# Patient Record
Sex: MALE | Race: WHITE
[De-identification: names, ages, dates, MRNs, and addresses within clinical notes are randomized per-mention and may not be internally consistent; named-entity substitution may affect disease eponyms.]

---

## 2018-11-27 ENCOUNTER — HOSPITAL ENCOUNTER (OUTPATIENT)
Dept: HOSPITAL 47 - PNWHC3 | Age: 58
LOS: 13 days | Discharge: HOME | End: 2018-12-10
Payer: COMMERCIAL

## 2018-11-27 VITALS — BODY MASS INDEX: 20.9 KG/M2

## 2018-11-27 DIAGNOSIS — M54.17: Primary | ICD-10-CM

## 2018-11-27 DIAGNOSIS — F17.200: ICD-10-CM

## 2018-11-27 DIAGNOSIS — Z79.899: ICD-10-CM

## 2018-11-27 PROCEDURE — 64483 NJX AA&/STRD TFRM EPI L/S 1: CPT

## 2018-11-27 PROCEDURE — 99211 OFF/OP EST MAY X REQ PHY/QHP: CPT

## 2018-11-27 NOTE — P.PAINCN
History of Present Illness





- Reason for Consult


Consult date: 11/27/18





- History of Present Illness


This is a 58 years old male with a chronic history of severe left lower 

extremity pain and numbness and tingling sensation, started March 2017, patient 

denies any initiating event, but he reported that he had lumbar laminectomy 

surgery done in 2001, and he did fairly well until March 2017, he reported that 

his pain and numbness increases with any activity especially walking, and is 

interfering with his quality of life, he denies any motor or sensory deficit, 

he denies any change in the bowel movement or urination, he tried physical 

therapy without any benefit, he tried Neurontin without any benefit, he gets 

some relief with the Motrin, he denies any symptoms in the right lower extremity

,





Past Medical History


Past Medical History: Hypertension, Osteoarthritis (OA)


Additional Past Medical History / Comment(s): STATES ARTHRITIS SINCE HE WAS A 

TEENAGER., DIFFICULTY WALKING - LEFT FOOT NUMB AND PAIN RADIATES UP HIS LEG. ,  

HX EPISODE OF CONSTIPATION WITH BLEEDING.


History of Any Multi-Drug Resistant Organisms: None Reported


Past Surgical History: Back Surgery


Additional Past Surgical History / Comment(s): BACK SURGERY (2001- L-4, L-5)


Past Anesthesia/Blood Transfusion Reactions: No Reported Reaction


Smoking Status: Current every day smoker





- Past Family History


  ** Mother


Family Medical History: No Reported History





Medications and Allergies


 Home Medications











 Medication  Instructions  Recorded  Confirmed  Type


 


Ibuprofen 600 mg PO AS DIRECTED PRN 11/21/18 11/21/18 History


 


Lisinopril 20 mg PO DAILY 11/21/18 11/21/18 History


 


Metoprolol Tartrate [Lopressor] 25 mg PO BID 11/21/18 11/21/18 History











 Allergies











Allergy/AdvReac Type Severity Reaction Status Date / Time


 


No Known Allergies Allergy   Verified 11/21/18 10:04














Physical Exam


Vitals: 


 Vital Signs











  Pulse Resp Pulse Ox


 


 11/27/18 12:42  65  16  94 L











          Social history         :  smoker  , NO ETOH  , NO Illegal drugs use .


    


         Review of Systems :


          1- Constitutional      : no  chills , no  fever  , no  night sweats  

,   


          2- Ears                     :  no ear discharge ,   no  change in 

hearing


          3-Nose, Mouth ,Throat ; no bleeding gums, no sore throat  , no 

epistaxis  ,


         4-Cardiovascular      : Denies chest pain, , no  orthopnea , no 

palpitation, he had a history of high blood pressure   


         5-Respiratory            : Denies cough ,  no dyspnea , no hemoptysis  


         6-Gastrointestinal     :,  no change in bowel habits  , no coffee-

ground emesis  . 


         7-Genitourinary        : No hematuria  , no discharge   , no 

incontinence,       


         8-Musculoskeletal     : No gait dysfunction  ,  report low back pain   

,   


         9- Neurological       :  no ataxia   , no tremor   , no sezure ,      

                                                                        


         10-Psychatric        , no suicidal ideation   no hallucination  


         11- Endocrine        :  no cold intolerence  ,  no polyuria  , no 

polydypsia  ,    


         12-Hematologic      : no  easy  bleeding  , no easy brusing   ,    


         13-Allergic / immunology  : no angioedema   , no wheezing    ,no 

allergic  rhinitis     


         14-Integumentary   : no brttle nails   , no change hair / nails   , no 

foot/leg ulcers .


    


        Physical Examinations :


         1-Constitutional : Cooperative , not in acute distress .


        2-HEENT    :  nech ;  supple ,  no Lymphadenopathy  , no Thyromegaly  ,


                               :eyes  , no icterus,  no photophobia .  


                               ENT  :  , normal  oropharynx     , no Thrush 


        3- Respiratory     : Chest clear to auscultations Bilaterally  ,  no 

wheezing                                                                       

                                                                               

                                       .  


        4- Cardiovascular : regular rate and rhythem , S1 ,  S2  ,   no  S3 ,  

no  S4.


        5- Gastrointestinal: abdomen soft  no tenderness , no organomegally  .


        6- Genitourinary   :  Defferred  .


        7-Integumentary   : No cellulitis  ,  no ulcers   , normal skin turgor  

, no cyanotic   .


        8- neurologic       :  Cranial nerve II   to  XII  intact ,  no   focal 

neurological deffecit 


        9-psychatric        : alert ,  oriented  X 3  ,   appropriate affect   

, intact judgment  and insight  .  


       10-Lymphatic       :  no Lymphadenopathy.


        11- musculoskeltal:  normal gait    


                              Lumber spine


                                    moter stegnth lower extremities ,thigh and 

legs  5/5 Right side ,  5/5  Left side 


                                    deep tendon reflexes :   normal  Knee Jerk 

   , normal   ankle Jerk  


                                    lumber facet Loading Test, negative 

bilaterally


                                    Range of motion of the lumbar spine  

Flexion  60 degrees,   extension  30 degrees


                                    strait leg raising test , negative 

bilaterally  


                                    Fabere test negative bilaterally


                                    Normal sensation in the lower extremities


                                 





Results


Comments: 


MRI of the lumbar spine done 10/09/2018 at [address] [place] [address]= L5-S1 central 

discogenic protrusion towards the left side and narrowing of the spinal canal





Assessment and Plan


Plan: 


Assessment and plan=1-lumbar radiculopathy left  L5-S1 dermatomal distribution, 

patient will be good candidate to have left L5-S1 transforaminal epidural 

steroid injection under fluoroscopy guidance , also patient could benefit from 

Lyrica 25 mg twice a day and it will be increased in the future as tolerated, 

patient used Neurontin in the past without any benefit. 


Patient blood pressure is 205/125 and explained to the patient and this is 

hypertension emergency and he need to monitor the emergency room to manage his 

blood pressure patient reported that he is very nervous about coming to the 

pain clinic , but I explained to him that he needs to go to the emergency room 

to manage his blood pressure , and explained to the patient the possible 

complication of hyper tension witches could affect his heart , could cause a 

stroke , and it will affect his kidney and his eyes 


Time with Patient: Greater than 30





PQRS Measure Charge Sheet


Measure #130: Documentation of Current Meds in Medical Chart: Patient's 

medications documented in chart


Measure #226: Tobacco Use: Screen & Cessation Intervention: Pt screened for 

tobacco use AND intervention given


Measure #111: Pneumonia Vaccination: Pneumococcal vaccine NOT administered or 

previously given


Measure #47: Advance Care Plan: Advance care planning discussed & documented, 

pt chose/unable to give


Measure #412: Opioid Treatment Agreement: No documentation of signed opioid 

treatment agreement


Measure #408: Opioid Therapy Follow-up Evaluation: Patient had NO f/u eval 

minimum every 3 months during opioid therapy


Measure #317: Preventitive Care & Scrn High Bld Press & F/U: Pre-hypertensive 

or hypertensive BP documented, pt will f/u with PCP


Measure #128: Body Mass Index (BMI) Screening & Follow-up: BMI documented 

within normal parameters


Measure #131: Pain Assessment & Follow-up: Pain positive & plan documented, 

Follow-up scheduled


Measure #431: Unhealthy Alcohol Use Preventative Care & Scrn: Patient not 

identified as an unhealthy alcohol user


PQRS Narrative: 


 





Smoking Status                   Current every day smoker


Do You Want the Pneumonia        No


Vaccine AT THIS TIME?            


Pain Intensity [Left Foot]       8


Scale Used                       Numeric (1 - 10)


Hx Alcohol Use (MH)              Yes








Home Medications: 


Ambulatory Orders





Ibuprofen 600 mg PO AS DIRECTED PRN 11/21/18 


Lisinopril 20 mg PO DAILY 11/21/18 


Metoprolol Tartrate [Lopressor] 25 mg PO BID 11/21/18

## 2018-12-10 ENCOUNTER — HOSPITAL ENCOUNTER (OUTPATIENT)
Dept: HOSPITAL 47 - ORPAIN | Age: 58
Discharge: HOME | End: 2018-12-10
Attending: ANESTHESIOLOGY
Payer: COMMERCIAL

## 2018-12-10 VITALS
TEMPERATURE: 98 F | DIASTOLIC BLOOD PRESSURE: 94 MMHG | RESPIRATION RATE: 16 BRPM | HEART RATE: 77 BPM | SYSTOLIC BLOOD PRESSURE: 157 MMHG | RESPIRATION RATE: 18 BRPM

## 2018-12-10 VITALS — BODY MASS INDEX: 20.3 KG/M2

## 2018-12-10 DIAGNOSIS — Z79.899: ICD-10-CM

## 2018-12-10 DIAGNOSIS — M51.17: Primary | ICD-10-CM

## 2018-12-10 DIAGNOSIS — I10: ICD-10-CM

## 2018-12-10 DIAGNOSIS — F17.200: ICD-10-CM

## 2018-12-10 DIAGNOSIS — M19.90: ICD-10-CM

## 2018-12-10 PROCEDURE — 62323 NJX INTERLAMINAR LMBR/SAC: CPT

## 2018-12-10 NOTE — FL
Fluoroscopy

 

INDICATION: Pain

 

FINDINGS:

 

Fluoroscopy time: 7 seconds.

 

Images obtained: 3.

 

IMPRESSIONS:

1. Documentation of fluoroscopy.

## 2018-12-10 NOTE — P.PCN
Date of Procedure: 12/10/18


Procedure(s) Performed: 








PREOPERATIVE DIAGNOSIS: Lumbar radiculopathy in left  L5-S1 distribution


POSTOPERATIVE DIAGNOSIS: Lumbar radiculopathy in left L5-S1  distribution


PROCEDURE


1. Transforaminal epidural steroid injection under fluoroscopic guidance at 

left L5-S1  level. 


2. Lumbar epidurogram :


ANESTHESIA: Local with 1% lidocaine 3 ml , moderate sedation with intravenous 

Versed 2 mg  and fentanyle 100 micrograms


EBL: Minimal


PROCEDURE INDICATION: The patient with low back pain and radiculopathy symptoms 

unresponsive to conservative treatment.


PROCEDURE DESCRIPTION / TECHNIQUE: 


  The patient was seen and identified in the preoperative area. Risks, benefits

, complications, and alternatives were discussed with the patient. The patient 

agreed to proceed with the procedure and signed the consent. IV was started, 

and vital signs were stable.


  Patient was taken to the OR and time out was completed. The patient was 

placed  in the prone position on procedure table and a pillow was placed under 

the abdomen to reduce lumbar lordosis. The  lumbosacral area was prepped  and 

draped in the usual sterile fashion. Critical pause was taken. Vital signs were 

closely monitored during the procedure. Conscious sedation was used during the 

procedure to decrease patient s anxiety. 


Using oblique fluoroscopy, the chin of the ``Christiano dog  at  left L5-S1  level 

was identified, and the skin and deeper tissues just below was localized with 1

% lidocaine. Subsequently, a 22-gauge 3.5-inch spinal needle was advanced under 

a tunneled view fluoroscopic guidance just underneath the chin of the ``Christiano 

dog at the left L5-S1. Under lateral fluoroscopy, the needle was then advanced 

to the posterior border of the Left L5-S1  interforaminal  space. After 

negative aspiration of CSF and blood and with no paresthesias, 1 mL Isovue  200 

contrast dye was injected excellent epidurogram and outlining of the  nerve 

root Subsequently, 3 mL of block solution containing 40 mg Depo-Medrol  and 2 

mL of  Lidocaine 1% was injected. Needle was removed . At the end of the 

procedure, skin was cleansed, and bandages were applied.


COMPLICATIONS:none 


DISPOSITION / PLANS: The patient was placed in a supine position and 

transferred to the recovery area in a stable condition for observation. There 

was no evidence of lower extremity motor or sensory deficit after the 

procedure.  Patient was discharged from the recovery room after meeting 

discharge criteria. Home discharge instructions were given to the patient by 

the staff. The patient was reexamined prior to discharge.

## 2019-01-16 ENCOUNTER — HOSPITAL ENCOUNTER (OUTPATIENT)
Dept: HOSPITAL 47 - ORPAIN | Age: 59
Discharge: HOME | End: 2019-01-16
Attending: ANESTHESIOLOGY
Payer: COMMERCIAL

## 2019-01-16 VITALS — SYSTOLIC BLOOD PRESSURE: 169 MMHG | HEART RATE: 64 BPM | DIASTOLIC BLOOD PRESSURE: 92 MMHG

## 2019-01-16 VITALS — BODY MASS INDEX: 20.9 KG/M2

## 2019-01-16 VITALS — RESPIRATION RATE: 16 BRPM | TEMPERATURE: 97.5 F

## 2019-01-16 DIAGNOSIS — I10: ICD-10-CM

## 2019-01-16 DIAGNOSIS — M54.16: Primary | ICD-10-CM

## 2019-01-16 DIAGNOSIS — F17.200: ICD-10-CM

## 2019-01-16 DIAGNOSIS — Z79.899: ICD-10-CM

## 2019-01-16 DIAGNOSIS — M19.90: ICD-10-CM

## 2019-01-16 PROCEDURE — 99152 MOD SED SAME PHYS/QHP 5/>YRS: CPT

## 2019-01-16 PROCEDURE — 62323 NJX INTERLAMINAR LMBR/SAC: CPT

## 2019-01-16 NOTE — FL
Fluoroscopy

 

INDICATION: Pain

 

FINDINGS:

 

Fluoroscopy time: 16 seconds.

 

Images obtained: 1.

 

IMPRESSIONS:

1. Documentation of fluoroscopy.

## 2019-01-16 NOTE — P.PCN
Date of Procedure: 01/16/19


Description of Procedure: 


PREOPERATIVE DIAGNOSIS: Lumbar radiculopathy in left  L5-S1 distribution





POSTOPERATIVE DIAGNOSIS: Lumbar radiculopathy in left L5-S1  distribution





PROCEDURE


1. Transforaminal epidural steroid injection under fluoroscopic guidance at 

left L5-S1  level. 


2. Lumbar epidurogram :





ANESTHESIA: Local with 1% lidocaine 3 ml , moderate sedation with intravenous 

Versed 2 mg  and fentanyle 100 micrograms





EBL: Minimal





PROCEDURE INDICATION: The patient with low back pain and radiculopathy symptoms 

unresponsive to conservative treatment.





PROCEDURE DESCRIPTION / TECHNIQUE: 


  The patient was seen and identified in the preoperative area. Risks, benefits

, complications, and alternatives were discussed with the patient. The patient 

agreed to proceed with the procedure and signed the consent. IV was started, 

and vital signs were stable.


  Patient was taken to the OR and time out was completed. The patient was 

placed  in the prone position on procedure table and a pillow was placed under 

the abdomen to reduce lumbar lordosis. The  lumbosacral area was prepped  and 

draped in the usual sterile fashion. Critical pause was taken. Vital signs were 

closely monitored during the procedure. Conscious sedation was used during the 

procedure to decrease patient s anxiety. 


Using oblique fluoroscopy, the chin of the ``Christiano dog  at  left L5-S1  level 

was identified, and the skin and deeper tissues just below was localized with 1

% lidocaine. Subsequently, a 22-gauge 3.5-inch spinal needle was advanced under 

a tunneled view fluoroscopic guidance just underneath the chin of the ``Christiano 

dog at the left L5-S1. Under lateral fluoroscopy, the needle was then advanced 

to the posterior border of the Left L5-S1  interforaminal  space. After 

negative aspiration of CSF and blood and with no paresthesias, 1 mL Isovue  200 

contrast dye was injected excellent epidurogram and outlining of the  nerve 

root Subsequently, 3 mL of block solution containing 40 mg Depo-Medrol  and 2 

mL of  Lidocaine 1% was injected. Needle was removed . At the end of the 

procedure, skin was cleansed, and bandages were applied.





COMPLICATIONS:none 





DISPOSITION / PLANS: The patient was placed in a supine position and 

transferred to the recovery area in a stable condition for observation. There 

was no evidence of lower extremity motor or sensory deficit after the 

procedure.  Patient was discharged from the recovery room after meeting 

discharge criteria. Home discharge instructions were given to the patient by 

the staff. The patient was reexamined prior to discharge.

## 2019-02-11 NOTE — P.GSHP
History of Present Illness


H&P Date: 01/16/19





58-year-old male presenting for transforaminal epidural steroid injection L5-S1

, radiating pain down his left leg to the dorsal aspect of his foot.  VAS is 6 

out of 10 in severity.


 


Physical Exam :


CVS: Regular rate and rhythm, no peripheral edema


Pulmonary: Nonlabored, no wheezing





Plan: Transforaminal epidural steroid injection left L5-S1





Past Medical History


Past Medical History: Hypertension, Osteoarthritis (OA)


Additional Past Medical History / Comment(s): STATES ARTHRITIS SINCE HE WAS A 

TEENAGER., DIFFICULTY WALKING - LEFT FOOT NUMB AND PAIN RADIATES UP HIS LEG. ,  

HX EPISODE OF CONSTIPATION WITH BLEEDING.


History of Any Multi-Drug Resistant Organisms: None Reported


Past Surgical History: Back Surgery


Additional Past Surgical History / Comment(s): BACK SURGERY (2001- L-4, L-5)


Past Anesthesia/Blood Transfusion Reactions: No Reported Reaction


Smoking Status: Current every day smoker





- Past Family History


  ** Mother


Family Medical History: No Reported History





Medications and Allergies


 Home Medications











 Medication  Instructions  Recorded  Confirmed  Type


 


Ibuprofen 600 mg PO AS DIRECTED PRN 11/21/18 01/14/19 History


 


Lisinopril 20 mg PO DAILY 11/21/18 01/14/19 History


 


Pregabalin [Lyrica] 25 mg PO BID 01/14/19 01/14/19 History











 Allergies











Allergy/AdvReac Type Severity Reaction Status Date / Time


 


No Known Allergies Allergy   Verified 01/16/19 11:07














Surgical - Exam


 Vital Signs











Temp Pulse Resp BP Pulse Ox


 


 97.5 F L  76   16   191/93   98 


 


 01/16/19 11:11  01/16/19 11:11  01/16/19 11:11  01/16/19 11:11  01/16/19 11:11

## 2019-02-19 ENCOUNTER — HOSPITAL ENCOUNTER (OUTPATIENT)
Dept: HOSPITAL 47 - PNWHC3 | Age: 59
Discharge: HOME | End: 2019-02-19
Payer: COMMERCIAL

## 2019-02-19 VITALS — DIASTOLIC BLOOD PRESSURE: 94 MMHG | HEART RATE: 85 BPM | RESPIRATION RATE: 16 BRPM | SYSTOLIC BLOOD PRESSURE: 162 MMHG

## 2019-02-19 DIAGNOSIS — M54.16: Primary | ICD-10-CM

## 2019-02-19 DIAGNOSIS — Z79.899: ICD-10-CM

## 2019-02-19 DIAGNOSIS — Z98.890: ICD-10-CM

## 2019-02-19 PROCEDURE — 99211 OFF/OP EST MAY X REQ PHY/QHP: CPT

## 2019-02-19 NOTE — P.PN
Subjective


Progress Note Date: 02/19/19





Adriana is a 58 year old gentleman who presents today for follow-up.  He 

continues to have left leg cramping and pain.  His left leg pain has been 

chronic in nature.  He he has minimal pain across his low back on both sides 

but most of the pain is going into the left leg.  We have trialed to 

transfemoral epidural steroid injections and he reports he had no relief we do 

one of them.  He continues to use Lyrica 25 mg twice per day and is recently 

ran out when he feels the difference after running out of the medications.  He 

is unsure about having any more injections because he did not have very good 

relief from any of the injections.  He's had back surgery in 2001.  He reports 

he continues to work.  He [place] [address] so makes it very difficult for him to 

come to physical therapy.  He continues to do home exercises daily.  He says to 

stay as physically active as he can.





Objective





- Vital Signs


Vital signs: 


 Vital Signs











Temp      


 


Pulse  85   02/19/19 13:37


 


Resp  16   02/19/19 13:37


 


BP  162/94   02/19/19 13:37


 


Pulse Ox  97   02/19/19 13:37








 Intake & Output











 02/18/19 02/19/19 02/19/19





 18:59 06:59 18:59


 


Weight   70.307 kg














- Exam





General: Awake and alert oriented 3 no distress


Respiratory exam: No audible wheezing no accessory muscle usage


Cardiovascular exam: regular rate, palpable bilateral pulses, no lower 

extremity edema


Abdominal exam: No distention nontender to palpation





Cervical spine: Normal alignment, Spurling's negative, facet loading negative





Lumbar spine: Loss of lumbar lordosis, normal alignment, tender to palpation 

over bilateral paraspinal muscles, facet loading is positive bilaterally.  

Straight leg raise is positive on the left.





Sacroiliac joints: Nontender to palpation, JUNE is negative, Gaenselon negative





Neuro exam: Normal sensation in bilateral upper extremities, deep tendon 

reflexes are 2+ bilateral upper extremities.  Normal sensation in bilateral 

lower extremities.  Deep tendon reflexes are 1+ in lower extremities





Psych exam: Cooperative, appropriate mood





Assessment and Plan


Assessment: 





Lumbar radiculopathy


Plan: 





Plan is to refill the patient's Lyrica 25 mg twice a day.  I will also given a 

referral to see Dr. Melo for a second evaluation.  She'll follow-up in 3 

months time

## 2019-10-23 ENCOUNTER — HOSPITAL ENCOUNTER (OUTPATIENT)
Dept: HOSPITAL 47 - RADMRIMAIN | Age: 59
End: 2019-10-23
Attending: FAMILY MEDICINE
Payer: COMMERCIAL

## 2019-10-23 DIAGNOSIS — M51.27: Primary | ICD-10-CM

## 2019-10-23 DIAGNOSIS — M46.97: ICD-10-CM

## 2019-10-23 DIAGNOSIS — M51.36: ICD-10-CM

## 2019-10-23 PROCEDURE — 72148 MRI LUMBAR SPINE W/O DYE: CPT

## 2019-10-23 NOTE — MR
EXAMINATION TYPE: MR lumbar spine wo con

 

DATE OF EXAM: 10/23/2019

 

COMPARISON: None

 

HISTORY: Back pain

 

TECHNIQUE: 

Multiplanar, multisequence images of the lumbar spine were acquired.

 

L1-L2: Circumferential posterior extension of disc material causes mild anterior mass effect on the t
hecal sac. No significant central stenosis or foraminal encroachment.

 

L2-L3: Normal disc appearance without desiccation.  No herniation, protrusion or disc bulging.  No ca
nal stenosis is present.  Foramina are patent bilaterally.

 

L3-L4: Circumferential disc bulge causes mild anterior mass effect on the thecal sac. No central sten
osis. No significant foraminal encroachment.

 

L4-L5: Posterior broad-based disc bulge causes mild anterior mass effect on the thecal sac. No signif
icant spinal stenosis. Facet arthropathy with hypertrophy of the ligamentum flavum causes posterior l
ateral mass effect on the thecal sac. Circumferential extension of disc material results in foraminal
 encroachment greater on the right, right lateral disc herniation is suspected.

 

L5-S1: No significant spinal stenosis. Facet arthropathy changes present. There is a left posterior p
aracentral disc herniation causing anterolateral mass effect on the thecal sac towards the left and l
ikely mass effect on the left greater than right S1 nerve roots. Circumferential extension endplate d
isc complex results in foraminal encroachment left greater than right.

 

Lumbar segments are intact.  No paraspinal masses are identified.  Conus medullaris has a normal appe
arance. Lumbar vertebral bodies show preserved height and alignment. There is loss of disc height sig
nal greatest at L5-S1 and L1-2 compatible disc desiccation and degenerative disc disease. There is mu
ltilevel spondylosis with endplate discogenic marrow signal change.

 

IMPRESSION:

Disc herniation L5-S1, correlate for left S1 radiculopathy. Lateral disc herniation L4-5, correlate f
or L4 radiculopathy on the right. Multilevel facet arthropathy. Degenerative disc disease.